# Patient Record
Sex: MALE | Race: BLACK OR AFRICAN AMERICAN | NOT HISPANIC OR LATINO | Employment: FULL TIME | URBAN - METROPOLITAN AREA
[De-identification: names, ages, dates, MRNs, and addresses within clinical notes are randomized per-mention and may not be internally consistent; named-entity substitution may affect disease eponyms.]

---

## 2017-11-25 ENCOUNTER — HOSPITAL ENCOUNTER (EMERGENCY)
Facility: OTHER | Age: 32
Discharge: HOME | End: 2017-11-25
Attending: EMERGENCY MEDICINE
Payer: MEDICAID

## 2017-11-25 VITALS
BODY MASS INDEX: 29.4 KG/M2 | TEMPERATURE: 98 F | OXYGEN SATURATION: 98 % | RESPIRATION RATE: 16 BRPM | WEIGHT: 210 LBS | DIASTOLIC BLOOD PRESSURE: 67 MMHG | HEIGHT: 71 IN | SYSTOLIC BLOOD PRESSURE: 114 MMHG | HEART RATE: 71 BPM

## 2017-11-25 DIAGNOSIS — M25.561 ACUTE PAIN OF RIGHT KNEE: Primary | ICD-10-CM

## 2017-11-25 DIAGNOSIS — T14.90XA INJURY: ICD-10-CM

## 2017-11-25 DIAGNOSIS — M25.461 EFFUSION OF RIGHT KNEE: ICD-10-CM

## 2017-11-25 PROCEDURE — 25000003 PHARM REV CODE 250: Performed by: PHYSICIAN ASSISTANT

## 2017-11-25 PROCEDURE — 96372 THER/PROPH/DIAG INJ SC/IM: CPT

## 2017-11-25 PROCEDURE — 99283 EMERGENCY DEPT VISIT LOW MDM: CPT | Mod: 25

## 2017-11-25 PROCEDURE — 63600175 PHARM REV CODE 636 W HCPCS: Performed by: PHYSICIAN ASSISTANT

## 2017-11-25 RX ORDER — METHOCARBAMOL 500 MG/1
1000 TABLET, FILM COATED ORAL 3 TIMES DAILY
Qty: 18 TABLET | Refills: 0 | Status: SHIPPED | OUTPATIENT
Start: 2017-11-25 | End: 2017-11-28

## 2017-11-25 RX ORDER — KETOROLAC TROMETHAMINE 30 MG/ML
30 INJECTION, SOLUTION INTRAMUSCULAR; INTRAVENOUS
Status: COMPLETED | OUTPATIENT
Start: 2017-11-25 | End: 2017-11-25

## 2017-11-25 RX ORDER — METHOCARBAMOL 500 MG/1
1000 TABLET, FILM COATED ORAL
Status: COMPLETED | OUTPATIENT
Start: 2017-11-25 | End: 2017-11-25

## 2017-11-25 RX ORDER — IBUPROFEN 800 MG/1
800 TABLET ORAL EVERY 6 HOURS PRN
Qty: 20 TABLET | Refills: 0 | Status: SHIPPED | OUTPATIENT
Start: 2017-11-25

## 2017-11-25 RX ADMIN — METHOCARBAMOL 1000 MG: 500 TABLET ORAL at 10:11

## 2017-11-25 RX ADMIN — KETOROLAC TROMETHAMINE 30 MG: 30 INJECTION, SOLUTION INTRAMUSCULAR at 10:11

## 2017-11-25 NOTE — ED PROVIDER NOTES
"Encounter Date: 11/25/2017       History     Chief Complaint   Patient presents with    Leg Pain     + rigth sided knee pain radiating to left lower leg " after playing football on Thanksgiving Day". Denies numbnes/tingling to extremity. + 2 pulses     32-year-old male with history of GERD and left knee anterior cruciate ligament reconstruction presents to the emergency department with complaints of right knee pain.  He states he was playing football on Thanksgiving day when he planted and turns.  He complains of pain to the superior anterior aspect of his knee.  He states it is worse with movement and reports that it "gets tight."  He denies any numbness, weakness, swelling or ecchymosis.  He complains of pain is an 8 out of 10.  Treated with ibuprofen with little to no relief.      The history is provided by the patient and the spouse.     Review of patient's allergies indicates:  No Known Allergies  Past Medical History:   Diagnosis Date    GERD (gastroesophageal reflux disease)      Past Surgical History:   Procedure Laterality Date    KNEE ARTHROSCOPY W/ ACL RECONSTRUCTION      left knee    WISDOM TOOTH EXTRACTION       Family History   Problem Relation Age of Onset    Diabetes Father 43    Kidney disease Father      Social History   Substance Use Topics    Smoking status: Current Every Day Smoker     Packs/day: 0.50     Years: 7.00     Types: Cigarettes    Smokeless tobacco: Never Used    Alcohol use Yes      Comment: socially     Review of Systems   Constitutional: Negative for chills and fever.   HENT: Negative for sore throat.    Respiratory: Negative for shortness of breath.    Cardiovascular: Negative for chest pain.   Gastrointestinal: Negative for nausea and vomiting.   Genitourinary: Negative for dysuria.   Musculoskeletal: Positive for arthralgias (right knee). Negative for back pain, joint swelling, neck pain and neck stiffness.   Skin: Negative for rash.   Neurological: Negative for " weakness and numbness.   Hematological: Does not bruise/bleed easily.       Physical Exam     Initial Vitals [11/25/17 0920]   BP Pulse Resp Temp SpO2   139/85 78 16 97.6 °F (36.4 °C) 96 %      MAP       103         Physical Exam    Nursing note and vitals reviewed.  Constitutional: Vital signs are normal. He appears well-developed and well-nourished.  Non-toxic appearance. No distress.   HENT:   Head: Normocephalic and atraumatic.   Right Ear: External ear normal.   Left Ear: External ear normal.   Nose: Nose normal.   Eyes: Conjunctivae, EOM and lids are normal. Pupils are equal, round, and reactive to light. No scleral icterus.   Neck: Normal range of motion and phonation normal. Neck supple.   Cardiovascular: Normal rate, regular rhythm, normal heart sounds and intact distal pulses. Exam reveals no gallop and no friction rub.    No murmur heard.  Abdominal: Normal appearance.   Musculoskeletal: Normal range of motion.        Right knee: He exhibits normal range of motion, no swelling, no effusion, no ecchymosis, no deformity, no laceration, no erythema, normal alignment, no LCL laxity, normal patellar mobility, no bony tenderness, normal meniscus and no MCL laxity. No medial joint line, no lateral joint line, no MCL, no LCL and no patellar tendon tenderness noted.        Legs:  No obvious deformities, moving all extremities, normal gait  Right knee-pain to the superior anterior aspect of the knee without significant tenderness palpation.  No pain with valgus or varus strain.  Strength 5 out of 5.  Intact distal pulses and no sensory deficits.  No bony deformity.  No significant edema or obvious effusion.  No ecchymosis.   Neurological: He is alert and oriented to person, place, and time. He has normal strength and normal reflexes. No sensory deficit.   Skin: Skin is warm, dry and intact. Capillary refill takes less than 2 seconds. No abrasion, no bruising, no ecchymosis, no laceration, no lesion and no rash  noted. No erythema.   Psychiatric: He has a normal mood and affect. His speech is normal and behavior is normal. Judgment normal. Cognition and memory are normal.         ED Course   Procedures  Labs Reviewed - No data to display     Imaging Results          X-Ray Knee 3 View Right (Final result)  Result time 11/25/17 10:33:46    Final result by Guillermina Molina MD (11/25/17 10:33:46)                 Impression:     Small joint effusion       Electronically signed by: GUILLERMINA MOLINA MD  Date:     11/25/17  Time:    10:33              Narrative:    Right knee radiographs    Results: AP, lateral and patellar view. The alignment appears normal.  No fracture or subluxation.No significant degenerative change.  No osseous lesions.  Small joint effusion.  The soft tissues appear normal.                              X-Rays:   Independently Interpreted Readings:   Other Readings:  Right knee- no acute fracture or dislocation    Medical Decision Making:   History:   I obtained history from: someone other than patient.       <> Summary of History: spouse  Old Medical Records: I decided to obtain old medical records.  Initial Assessment:   32-year-old male with complaints consistent with right knee pain with effusion status post injury.  Vital signs stable, afebrile, neurovascularly intact.  He is alert, healthy and nontoxic appearing.  He is in no apparent distress.  No focal neurological deficits.  Exam documented above.  No significant signs of trauma or injury.  Intact distal pulses.  No bony deformity or bony tenderness palpation.  No sniffing pain with range of motion.  No pain with valgus or varus strain.  I doubt ligamentous injury, fracture or dislocation  Independently Interpreted Test(s):   I have ordered and independently interpreted X-rays - see prior notes.  Clinical Tests:   Radiological Study: Ordered and Reviewed  ED Management:  X-ray obtained independently interpreted by myself with no evidence of  fracture dislocation.  He does have a small joint effusion noted by radiology.  Will apply Ace wrap.  He was administered Toradol and Robaxin in the emergency department.  He is stable and will be discharged home with prescription for ibuprofen and Robaxin.  He is to follow-up with orthopedics at first available appointment or return for any worsening signs or symptoms.  He states understanding.  This patient was discussed with the attending physician who agrees with treatment plan.  Other:   I have discussed this case with another health care provider.       <> Summary of the Discussion: Jono  This note was created using Dragon Medical dictation.  There may be typographical errors secondary to dictation.                     ED Course      Clinical Impression:     1. Acute pain of right knee    2. Injury    3. Effusion of right knee          Disposition:   Disposition: Discharged  Condition: Stable                        Lauryn Landa PA-C  11/25/17 1046

## 2017-11-25 NOTE — ED TRIAGE NOTES
""I was playing football yesterday and trying to make a cut and I hurt my (right) knee. I don't know if I hurt or bruised it." He denies numbness and tingling to his toes.   "

## 2017-11-28 ENCOUNTER — HOSPITAL ENCOUNTER (EMERGENCY)
Facility: OTHER | Age: 32
Discharge: HOME | End: 2017-11-28
Attending: EMERGENCY MEDICINE
Payer: MEDICAID

## 2017-11-28 VITALS
SYSTOLIC BLOOD PRESSURE: 122 MMHG | HEART RATE: 87 BPM | WEIGHT: 190 LBS | BODY MASS INDEX: 25.73 KG/M2 | RESPIRATION RATE: 17 BRPM | HEIGHT: 72 IN | DIASTOLIC BLOOD PRESSURE: 68 MMHG | TEMPERATURE: 99 F | OXYGEN SATURATION: 97 %

## 2017-11-28 DIAGNOSIS — M79.89 LEG SWELLING: ICD-10-CM

## 2017-11-28 DIAGNOSIS — M25.561 ACUTE PAIN OF RIGHT KNEE: Primary | ICD-10-CM

## 2017-11-28 DIAGNOSIS — M79.89 RIGHT LEG SWELLING: ICD-10-CM

## 2017-11-28 PROCEDURE — 99284 EMERGENCY DEPT VISIT MOD MDM: CPT | Mod: 25

## 2017-11-28 PROCEDURE — 29505 APPLICATION LONG LEG SPLINT: CPT | Mod: RT

## 2017-11-28 NOTE — ED PROVIDER NOTES
"Encounter Date: 11/28/2017       History     Chief Complaint   Patient presents with    Leg Swelling     "I was here Saturday and now I'm back for the same thing except now it's not just my knee. The back of my calf is swollen and hurting and down into my ankle."     32-year-old male with GERD and history of left knee arthroscopy with anterior cruciate ligament reconstruction presents to emergency department with complaints of right knee pain and swelling.  He states the pain and swelling is now extending down into his calf and ankle.  He states he's been treating with ibuprofen and Robaxin as prescribed however reports a 15 little to no relief.  He denies any redness, warmth, streaking, fever, chills.  He states he attempted to make an appointment with orthopedist however admits that he doesn't have any insurance and couldn't afford to go.he complains of pain is currently an 8 out of 10.      The history is provided by the patient.     Review of patient's allergies indicates:  No Known Allergies  Past Medical History:   Diagnosis Date    GERD (gastroesophageal reflux disease)      Past Surgical History:   Procedure Laterality Date    KNEE ARTHROSCOPY W/ ACL RECONSTRUCTION      left knee    WISDOM TOOTH EXTRACTION       Family History   Problem Relation Age of Onset    Diabetes Father 43    Kidney disease Father      Social History   Substance Use Topics    Smoking status: Current Every Day Smoker     Packs/day: 0.50     Years: 7.00     Types: Cigarettes    Smokeless tobacco: Never Used    Alcohol use Yes      Comment: socially     Review of Systems   Constitutional: Negative for chills and fever.   HENT: Negative for sore throat.    Respiratory: Negative for shortness of breath.    Cardiovascular: Negative for chest pain.   Gastrointestinal: Negative for nausea and vomiting.   Genitourinary: Negative for dysuria.   Musculoskeletal: Positive for arthralgias and joint swelling. Negative for back pain, neck " pain and neck stiffness.        Right lower extremity   Skin: Negative for rash.   Neurological: Negative for weakness and numbness.   Hematological: Does not bruise/bleed easily.       Physical Exam     Initial Vitals [11/28/17 1052]   BP Pulse Resp Temp SpO2   (!) 143/75 87 16 98.2 °F (36.8 °C) 97 %      MAP       97.67         Physical Exam    Nursing note and vitals reviewed.  Constitutional: Vital signs are normal. He appears well-developed and well-nourished.  Non-toxic appearance. No distress.   HENT:   Head: Normocephalic and atraumatic.   Right Ear: External ear normal.   Left Ear: External ear normal.   Nose: Nose normal.   Eyes: Conjunctivae, EOM and lids are normal. Pupils are equal, round, and reactive to light. No scleral icterus.   Neck: Normal range of motion and phonation normal. Neck supple.   Cardiovascular: Normal rate, regular rhythm, normal heart sounds, intact distal pulses and normal pulses. Exam reveals no gallop, no friction rub and no decreased pulses.    No murmur heard.  Pulses:       Dorsalis pedis pulses are 2+ on the right side, and 2+ on the left side.   No pitting edema to bilateral lower extremities   Abdominal: Normal appearance.   Musculoskeletal: Normal range of motion.   No obvious deformities, moving all extremities, normal gait  Right lower extremity-edema noted to the right ankle.  No obvious edema to the right calf.  Questionable palpable effusion versus Baker's cyst posterior right knee.  No bony deformity or bony tenderness palpation to the anterior right knee full range of motion of the knee with no elicited pain.  No pain with valgus or varus strain.  Negative Homans sign.  Intact distal pulses with no sensory deficits.  Capillary refill less than 3 seconds.   Neurological: He is alert and oriented to person, place, and time. He has normal strength. He displays no atrophy. No sensory deficit. He exhibits normal muscle tone.   Skin: Skin is warm, dry and intact.  Capillary refill takes less than 2 seconds. No abrasion, no bruising, no ecchymosis, no laceration, no lesion and no rash noted. No erythema.   Psychiatric: He has a normal mood and affect. His speech is normal and behavior is normal. Judgment normal. Cognition and memory are normal.         ED Course   Procedures  Labs Reviewed - No data to display     Imaging Results          US Lower Extremity Veins Right (Final result)  Result time 11/28/17 12:28:21    Final result by Mark Anthony Estrada MD (11/28/17 12:28:21)                 Impression:      No convincing evidence of DVT in the right lower extremity.      Electronically signed by: MARK ANTHONY ESTRADA MD, MD  Date:     11/28/17  Time:    12:28              Narrative:    COMPARISON: None    TECHNIQUE:  Gray scale graded compression, color flow and Doppler waveform analysis of the right lower extremity venous system.      FINDINGS:  The imaged veins of the right lower extremity demonstrate normal gray scale graded compression, color flow and Doppler waveforms, normal respiratory phasicity and augmentation.    No discreet fluid collections.                                 Medical Decision Making:   History:   Old Medical Records: I decided to obtain old medical records.  Initial Assessment:   32-year-old male with complaints consistent with right leg swelling with associated right knee pain and swelling.  Afebrile neurovascularly intact.  He is alert, healthy and nontoxic appearing.  He is in no apparent distress.  No focal neurological deficits.  Exam documented above.  No evidence of serious bacterial infection.  I do believe that patient's swelling is secondary to dependent edema from soft tissue injury of the knee.  He was seen here on Saturday by me for an x-ray was obtained with no evidence of acute fracture dislocation.  He has been taking ibuprofen and Robaxin at home.  Clinical Tests:   Radiological Study: Ordered and Reviewed  ED Management:  Ultrasound was obtained  to rule out DVT with no evidence of DVT. He has no discrete fluid collections.  He is placed in a knee immobilizer and given crutches.  He is given care instructions and urged to follow-up with orthopedist at first available appointment or return for any worsening signs or symptoms.  He is given information for \[facility]\"" orthopedics for follow-up as he states that he attempted to follow-up with [facility] orthopedics and was requiring down payment which she could not afford secondary to no insurance.he is urged return for any worsening signs or symptoms.  This patient was discussed with the attending physician who agrees with treatment plan.  Other:   I have discussed this case with another health care provider.       <> Summary of the Discussion: Kush  This note was created using Dragon Medical dictation.  There may be typographical errors secondary to dictation.                     ED Course      Clinical Impression:     1. Acute pain of right knee    2. Leg swelling    3. Right leg swelling          Disposition:   Disposition: Discharged  Condition: Stable                        Lauryn Landa PA-C  11/28/17 1457

## 2017-11-28 NOTE — ED NOTES
Patient Identifiers for Salomon Wright checked and correct  LOC: The patient is awake, alert and aware of environment with an appropriate affect, the patient is oriented x 3 and speaking appropriate.  APPEARANCE: Patient resting comfortably and in no acute distress. The patient is clean and well groomed. The patient's clothing is properly fastened.  SKIN: The skin is warm and dry. The patient has normal skin turgor and moist mucus membranes. No rashes or lesions upon observation. Skin Intact , no breakdown noted.  Musculoskeletal :  Normal range of motion noted. Moves all extremities well. Right calf swelling with palpation tenderness noted in the anterior right knee, both medial & lateral aspects, and right calf.  RESPIRATORY: Airway is open and patent, respirations are spontaneous, patient has a normal effort and rate.   PULSES: 2+ radial & pedal pulses, symmetrical in all extremities.  Will continue to monitor

## 2017-11-28 NOTE — ED TRIAGE NOTES
Pt c/o right knee pain which started after playing football on Thanksgiving. Pt c/o that calf pain developed on Sunday evening (4 days later). Pt taking ibuprofen & meloxicam with slight relief. Pt c/o that his pain is worsening.

## 2024-07-06 ENCOUNTER — HOSPITAL ENCOUNTER (EMERGENCY)
Facility: HOSPITAL | Age: 39
Discharge: HOME | End: 2024-07-06
Attending: EMERGENCY MEDICINE

## 2024-07-06 VITALS
WEIGHT: 195 LBS | RESPIRATION RATE: 18 BRPM | BODY MASS INDEX: 25.84 KG/M2 | DIASTOLIC BLOOD PRESSURE: 45 MMHG | HEIGHT: 73 IN | SYSTOLIC BLOOD PRESSURE: 137 MMHG | TEMPERATURE: 98 F | HEART RATE: 81 BPM | OXYGEN SATURATION: 98 %

## 2024-07-06 DIAGNOSIS — S86.912A KNEE STRAIN, LEFT, INITIAL ENCOUNTER: Primary | ICD-10-CM

## 2024-07-06 PROCEDURE — 25000003 PHARM REV CODE 250: Mod: ER | Performed by: EMERGENCY MEDICINE

## 2024-07-06 PROCEDURE — 99284 EMERGENCY DEPT VISIT MOD MDM: CPT | Mod: 25,ER

## 2024-07-06 PROCEDURE — 96372 THER/PROPH/DIAG INJ SC/IM: CPT | Performed by: EMERGENCY MEDICINE

## 2024-07-06 PROCEDURE — 63600175 PHARM REV CODE 636 W HCPCS: Mod: ER | Performed by: EMERGENCY MEDICINE

## 2024-07-06 RX ORDER — HYDROCODONE BITARTRATE AND ACETAMINOPHEN 5; 325 MG/1; MG/1
1 TABLET ORAL EVERY 6 HOURS PRN
Qty: 12 TABLET | Refills: 0 | Status: SHIPPED | OUTPATIENT
Start: 2024-07-06

## 2024-07-06 RX ORDER — HYDROCODONE BITARTRATE AND ACETAMINOPHEN 5; 325 MG/1; MG/1
1 TABLET ORAL EVERY 6 HOURS PRN
Qty: 12 TABLET | Refills: 0 | Status: SHIPPED | OUTPATIENT
Start: 2024-07-06 | End: 2024-07-06

## 2024-07-06 RX ORDER — DEXAMETHASONE SODIUM PHOSPHATE 4 MG/ML
10 INJECTION, SOLUTION INTRA-ARTICULAR; INTRALESIONAL; INTRAMUSCULAR; INTRAVENOUS; SOFT TISSUE
Status: COMPLETED | OUTPATIENT
Start: 2024-07-06 | End: 2024-07-06

## 2024-07-06 RX ORDER — PREDNISONE 20 MG/1
60 TABLET ORAL DAILY
Qty: 15 TABLET | Refills: 0 | Status: SHIPPED | OUTPATIENT
Start: 2024-07-07 | End: 2024-07-12

## 2024-07-06 RX ORDER — PREDNISONE 20 MG/1
60 TABLET ORAL DAILY
Qty: 15 TABLET | Refills: 0 | Status: SHIPPED | OUTPATIENT
Start: 2024-07-06 | End: 2024-07-06

## 2024-07-06 RX ORDER — OXYCODONE AND ACETAMINOPHEN 5; 325 MG/1; MG/1
1 TABLET ORAL
Status: COMPLETED | OUTPATIENT
Start: 2024-07-06 | End: 2024-07-06

## 2024-07-06 RX ADMIN — DEXAMETHASONE SODIUM PHOSPHATE 10 MG: 4 INJECTION INTRA-ARTICULAR; INTRALESIONAL; INTRAMUSCULAR; INTRAVENOUS; SOFT TISSUE at 07:07

## 2024-07-06 RX ADMIN — OXYCODONE HYDROCHLORIDE AND ACETAMINOPHEN 1 TABLET: 5; 325 TABLET ORAL at 07:07

## 2024-07-06 NOTE — DISCHARGE INSTRUCTIONS
Please use a heating pad.  Crutches.  No weight-bearing while painful.  Prednisone as directed.  Tylenol 1000 mg by mouth every 8 hours as needed for pain or Tylenol with hydrocodone as directed.  Rest.

## 2024-07-06 NOTE — ED TRIAGE NOTES
Salomon Wright, a 38 y.o. male presents to the ED w/ complaint of pain to left leg that started yesterday.  He describes it as starting in his left lower back and radiates down his buttock, leg and to his knee.  He denies any recent injury but had arthroscopy done years ago for a torn ligament.    Triage note:  Chief Complaint   Patient presents with    Leg Pain     Pt presents to ER with c/o pain to left lower back that radiates down his left leg as a radiating, burning pain unrelieved by otc meds.      Review of patient's allergies indicates:  No Known Allergies  Past Medical History:   Diagnosis Date    GERD (gastroesophageal reflux disease)

## 2024-07-06 NOTE — ED PROVIDER NOTES
Encounter Date: 7/6/2024       History     Chief Complaint   Patient presents with    Leg Pain     Pt presents to ER with c/o pain to left lower back that radiates down his left leg as a radiating, burning pain unrelieved by otc meds.      HPI  This 38-year-old white male presents emergency room complaining of sudden onset of pain in his left knee.  He bent his knee and developed severe pain that radiates up the back of his leg to his left low lumbar back he denies neurologic deficits.  He has a history of a knee surgery for an ACL construction.  He reports he was at rest bent his knee and developed severe pain.  There is no history of trauma or fever.  Review of patient's allergies indicates:  No Known Allergies  Past Medical History:   Diagnosis Date    GERD (gastroesophageal reflux disease)      Past Surgical History:   Procedure Laterality Date    KNEE ARTHROSCOPY W/ ACL RECONSTRUCTION      left knee    WISDOM TOOTH EXTRACTION       Family History   Problem Relation Name Age of Onset    Diabetes Father  43    Kidney disease Father       Social History     Tobacco Use    Smoking status: Every Day     Current packs/day: 0.50     Average packs/day: 0.5 packs/day for 7.0 years (3.5 ttl pk-yrs)     Types: Cigarettes    Smokeless tobacco: Never   Substance Use Topics    Alcohol use: Yes     Comment: socially    Drug use: Yes     Frequency: 3.0 times per week     Types: Marijuana     Review of Systems  The patient was questioned specifically with regard to the following.  General: Fever, chills, sweats. Neuro: Headache. Eyes: eye problems. ENT: Ear pain, sore throat. Cardiovascular: Chest pain. Respiratory: Cough, shortness of breath. Gastrointestinal: Abdominal pain, vomiting, diarrhea. Genitourinary: Painful urination.  Musculoskeletal: Arm and leg problems. Skin: Rash.  The review of systems was negative except for the following:  Left knee pain radiating to the left low lumbar back.  Physical Exam     Initial  Vitals [07/06/24 0618]   BP Pulse Resp Temp SpO2   (!) 137/45 81 18 98.4 °F (36.9 °C) 98 %      MAP       --         Physical Exam  The patient was examined specifically for the following:   General:No significant distress, Good color, Warm and dry. Head and neck:Scalp atraumatic, Neck supple. Neurological:Appropriate conversation, Gross motor deficits. Eyes:Conjugate gaze, Clear corneas. ENT: No epistaxis. Cardiac: Regular rate and rhythm, Grossly normal heart tones. Pulmonary: Wheezing, Rales. Gastrointestinal: Abdominal tenderness, Abdominal distention. Musculoskeletal: Extremity deformity, Apparent pain with range of motion of the joints. Skin: Rash.   The findings on examination were normal except for the following:  The patient is able to bear weight on the left lower extremity.  He avoids flexion.  There was no effusion erythema warmth ecchymosis instability there is an anterior surgical scar.  There is no popliteal tenderness there is no medial thigh tenderness.  There is no significant tenderness of the lumbar spine.  The patient appears to be very uncomfortable.  ED Course   Procedures  Labs Reviewed - No data to display       Imaging Results    None          Medications   dexAMETHasone injection 10 mg (has no administration in time range)   oxyCODONE-acetaminophen 5-325 mg per tablet 1 tablet (has no administration in time range)     Medical Decision Making  Given the above, there is no history of significant trauma to suggest fracture.  I considered x-rays but did not feel they would likely be helpful.  I doubt infection.  It is possible that there is a tendon injury.  Cartilaginous injury and ligamentous injury are also considered.  We will try treating this patient with a short course of steroids and refer him to follow up with the Orthopedics.  I will put him on crutches and treat him for pain.                                  Clinical Impression:  Final diagnoses:  [U31.129Z] Knee strain, left,  initial encounter (Primary)          ED Disposition Condition    Discharge Stable          ED Prescriptions       Medication Sig Dispense Start Date End Date Auth. Provider    HYDROcodone-acetaminophen (NORCO) 5-325 mg per tablet  (Status: Discontinued) Take 1 tablet by mouth every 6 (six) hours as needed for Pain. 12 tablet 7/6/2024 7/6/2024 Rasheed Oden MD    predniSONE (DELTASONE) 20 MG tablet  (Status: Discontinued) Take 3 tablets (60 mg total) by mouth once daily. for 5 days 15 tablet 7/6/2024 7/6/2024 Rasheed Oden MD    HYDROcodone-acetaminophen (NORCO) 5-325 mg per tablet Take 1 tablet by mouth every 6 (six) hours as needed for Pain. 12 tablet 7/6/2024 -- Rasheed Oden MD    predniSONE (DELTASONE) 20 MG tablet Take 3 tablets (60 mg total) by mouth once daily. for 5 days 15 tablet 7/7/2024 7/12/2024 Rasheed Oden MD          Follow-up Information       Follow up With Specialties Details Why Contact Info    Raleigh Salgado MD Orthopedic Surgery In 1 week  [address] [facility]  [address]  [facility] [id number]  [phone]               Rasheed Oden MD  07/06/24 0755

## 2024-07-11 ENCOUNTER — HOSPITAL ENCOUNTER (EMERGENCY)
Facility: OTHER | Age: 39
Discharge: HOME | End: 2024-07-12
Attending: EMERGENCY MEDICINE

## 2024-07-11 VITALS
HEART RATE: 87 BPM | DIASTOLIC BLOOD PRESSURE: 80 MMHG | WEIGHT: 195 LBS | RESPIRATION RATE: 20 BRPM | BODY MASS INDEX: 26.41 KG/M2 | OXYGEN SATURATION: 100 % | SYSTOLIC BLOOD PRESSURE: 139 MMHG | TEMPERATURE: 98 F | HEIGHT: 72 IN

## 2024-07-11 DIAGNOSIS — S89.92XD LEFT KNEE INJURY, SUBSEQUENT ENCOUNTER: Primary | ICD-10-CM

## 2024-07-11 PROCEDURE — 99281 EMR DPT VST MAYX REQ PHY/QHP: CPT

## 2024-07-11 NOTE — Clinical Note
"Salomon "Dorian Wright was seen and treated in our emergency department on 7/11/2024.  He may return to work on 07/18/2024.       If you have any questions or concerns, please don't hesitate to call.      Keagan Jorge MD"

## 2024-07-12 NOTE — ED TRIAGE NOTES
Patient presents to the ED via private car with LLE pain that radiates from left knee to upper thigh area with pain rating 10/10. Patient reports pain started about 1 week ago from today and visited the ER three times in one week for the same problem. Patient reports unrelieved pain from prescription medication that was given to him on his first ED but unsure of the name of the medicine. Patient has crutches with him due to inability to bear weight to LLE. Patient with h/o surgery to right knee. Patient denies chest pain and SOB.

## 2024-07-12 NOTE — ED NOTES
Pt was expecting to receive a prescription for his pain to the L Knee upon discharge.  Reports his L knee pain is still rated a 10 out of 10 at this time.      Attending advised and immediately stated that he was not going to write any more prescriptions for his pain bc he just recently had narcotics filled within the last week.    Pt was advised of the MD instructions to contact the orthopedic referral that has been provided & continue to take the remaining narcotic prescription that he recentlly received on his last ED visit.

## 2024-07-12 NOTE — ED PROVIDER NOTES
Chief complaint:  Leg Pain and Knee Pain (Pt here for left knee pain that radiates up to his thigh./Previous leg injury/Seen at other facilities for the same/Pt not able to bear weight/Rates pain 10/10)      HPI:  Salomon Wright is a 38 y.o. male presenting with left knee pain.  He was seen 2 times this month, 5 days ago and 3 days ago respectively.  On review of the medical record, in the 1st instance he was bearing weight and claimed he simply bent his knee subsequent onset of pain.  He does have a remote history of ACL repair.  On the 2nd instance he was non ambulatory and using crutches and claimed he fell down some steps to cause the pain.  He was given knee brace along with prescription for opioids and NSAID as necessary.  X-ray of the 2nd visit suggests osteoarthritis without fracture or dislocation.  Patient denies any new injury or change in pain.  He has not been able to follow up with the listed orthopedist given at the previous visits.  He also requests a new work note as he is unable to stand to perform his employment duties as a .    ROS: As per HPI and below:  No ankle pain, hip pain, redness laying, numbness, weakness, fever.    Review of patient's allergies indicates:  No Known Allergies    Patient's Medications   New Prescriptions    No medications on file   Previous Medications    HYDROCODONE-ACETAMINOPHEN (NORCO) 5-325 MG PER TABLET    Take 1 tablet by mouth every 6 (six) hours as needed for Pain.    PREDNISONE (DELTASONE) 20 MG TABLET    Take 3 tablets (60 mg total) by mouth once daily. for 5 days   Modified Medications    No medications on file   Discontinued Medications    No medications on file       PMH:  As per HPI and below:  Past Medical History:   Diagnosis Date    GERD (gastroesophageal reflux disease)      Past Surgical History:   Procedure Laterality Date    KNEE ARTHROSCOPY W/ ACL RECONSTRUCTION      left knee    WISDOM TOOTH EXTRACTION         Social History     Socioeconomic  History    Marital status:    Tobacco Use    Smoking status: Every Day     Current packs/day: 0.50     Average packs/day: 0.5 packs/day for 7.0 years (3.5 ttl pk-yrs)     Types: Cigarettes    Smokeless tobacco: Never   Substance and Sexual Activity    Alcohol use: Yes     Comment: socially    Drug use: Yes     Frequency: 3.0 times per week     Types: Marijuana    Sexual activity: Yes     Partners: Female       Family History   Problem Relation Name Age of Onset    Diabetes Father  43    Kidney disease Father         Physical Exam:    Vitals:    07/11/24 2244   BP: 139/80   Pulse: 87   Resp: 20   Temp: 98 °F (36.7 °C)     GENERAL:  No apparent distress.  Alert.    HEENT:  Moist mucous membranes.  Normocephalic and atraumatic.    NECK:  No swelling.  Midline trachea.   CARDIOVASCULAR:  Regular rate and rhythm.  2+ radial pulses.    PULMONARY:  Lungs clear to auscultation bilaterally.  No wheezes, rales, or rhonci.    EXTREMITIES:  Warm and well perfused.  Brisk capillary refill.  No joint effusion.  Patient is able to flex left knee 30° limited by pain.  Abnormal lever test on the left as compared to opposite side.  2+ DP and PT distal pulses in the bilateral lower extremities.  No significant knee tenderness to palpation.  No increased warmth or erythema.  No other leg tenderness to palpation.  NEUROLOGICAL:  Normal mental status.  Appropriate and conversant.  5/5 strength and sensation in the distal bilateral lower extremities.  SKIN:  No rashes or ecchymoses.      Labs Reviewed - No data to display    Current Discharge Medication List        CONTINUE these medications which have NOT CHANGED    Details   HYDROcodone-acetaminophen (NORCO) 5-325 mg per tablet Take 1 tablet by mouth every 6 (six) hours as needed for Pain.  Qty: 12 tablet, Refills: 0    Comments: Quantity prescribed m  Associated Diagnoses: Knee strain, left, initial encounter      predniSONE (DELTASONE) 20 MG tablet Take 3 tablets (60 mg total)  by mouth once daily. for 5 days  Qty: 15 tablet, Refills: 0             Orders Placed This Encounter   Procedures    Ambulatory referral/consult to Orthopedics       Imaging Results    None              MDM:    38 y.o. male with left knee pain without change since recent visit.  He has already had x-ray with very low suspicion for fracture or dislocation.  There is no sign of distal neurovascular compromise.  I doubt vascular issues such as popliteal injury or arterial occlusive disease.  Legs are symmetric.  I doubt DVT.  He has excellent distal pulses and I doubt acute arterial ischemia.  There is no sign of compartment syndrome.  I do have concern for internal derangement such as meniscal or ligamentous injury.  He has already been prescribed opioids and NSAIDs at recent visit he may continue.  Work note provided as requested.  Follow up information for orthopedics with referral placed at patient's request.  Continue knee brace and crutches as needed for ambulation.  Return precautions reviewed.    Diagnoses:    1. Left knee injury, subsequent encounter       Keagan Jorge MD  07/11/24 4236

## 2025-09-03 ENCOUNTER — OFFICE VISIT (OUTPATIENT)
Dept: PRIMARY CARE CLINIC | Facility: CLINIC | Age: 40
End: 2025-09-03
Payer: COMMERCIAL

## 2025-09-03 VITALS
DIASTOLIC BLOOD PRESSURE: 80 MMHG | BODY MASS INDEX: 27.58 KG/M2 | SYSTOLIC BLOOD PRESSURE: 122 MMHG | WEIGHT: 203.63 LBS | OXYGEN SATURATION: 98 % | HEART RATE: 107 BPM | HEIGHT: 72 IN

## 2025-09-03 DIAGNOSIS — Z76.89 ENCOUNTER TO ESTABLISH CARE: Primary | ICD-10-CM

## 2025-09-03 DIAGNOSIS — Z79.899 OTHER LONG TERM (CURRENT) DRUG THERAPY: ICD-10-CM

## 2025-09-03 DIAGNOSIS — Z11.4 ENCOUNTER FOR SCREENING FOR HIV: ICD-10-CM

## 2025-09-03 DIAGNOSIS — Z00.00 ANNUAL PHYSICAL EXAM: ICD-10-CM

## 2025-09-03 DIAGNOSIS — Z11.59 NEED FOR HEPATITIS C SCREENING TEST: ICD-10-CM

## 2025-09-03 DIAGNOSIS — Z11.3 SCREEN FOR STD (SEXUALLY TRANSMITTED DISEASE): ICD-10-CM

## 2025-09-03 PROCEDURE — 1159F MED LIST DOCD IN RCRD: CPT | Mod: CPTII,S$GLB,, | Performed by: STUDENT IN AN ORGANIZED HEALTH CARE EDUCATION/TRAINING PROGRAM

## 2025-09-03 PROCEDURE — 99999 PR PBB SHADOW E&M-EST. PATIENT-LVL III: CPT | Mod: PBBFAC,,, | Performed by: STUDENT IN AN ORGANIZED HEALTH CARE EDUCATION/TRAINING PROGRAM

## 2025-09-03 PROCEDURE — 3079F DIAST BP 80-89 MM HG: CPT | Mod: CPTII,S$GLB,, | Performed by: STUDENT IN AN ORGANIZED HEALTH CARE EDUCATION/TRAINING PROGRAM

## 2025-09-03 PROCEDURE — 93010 ELECTROCARDIOGRAM REPORT: CPT | Mod: S$GLB,,, | Performed by: INTERNAL MEDICINE

## 2025-09-03 PROCEDURE — 99396 PREV VISIT EST AGE 40-64: CPT | Mod: S$GLB,,, | Performed by: STUDENT IN AN ORGANIZED HEALTH CARE EDUCATION/TRAINING PROGRAM

## 2025-09-03 PROCEDURE — 3008F BODY MASS INDEX DOCD: CPT | Mod: CPTII,S$GLB,, | Performed by: STUDENT IN AN ORGANIZED HEALTH CARE EDUCATION/TRAINING PROGRAM

## 2025-09-03 PROCEDURE — 1160F RVW MEDS BY RX/DR IN RCRD: CPT | Mod: CPTII,S$GLB,, | Performed by: STUDENT IN AN ORGANIZED HEALTH CARE EDUCATION/TRAINING PROGRAM

## 2025-09-03 PROCEDURE — 3074F SYST BP LT 130 MM HG: CPT | Mod: CPTII,S$GLB,, | Performed by: STUDENT IN AN ORGANIZED HEALTH CARE EDUCATION/TRAINING PROGRAM

## 2025-09-04 LAB
OHS QRS DURATION: 96 MS
OHS QTC CALCULATION: 422 MS